# Patient Record
Sex: MALE | URBAN - METROPOLITAN AREA
[De-identification: names, ages, dates, MRNs, and addresses within clinical notes are randomized per-mention and may not be internally consistent; named-entity substitution may affect disease eponyms.]

---

## 2021-03-09 ENCOUNTER — TELEPHONE (OUTPATIENT)
Dept: OTHER | Age: 76
End: 2021-03-09

## 2021-03-18 ENCOUNTER — TELEPHONE (OUTPATIENT)
Dept: OTHER | Age: 76
End: 2021-03-18

## 2021-03-22 ENCOUNTER — TELEPHONE (OUTPATIENT)
Dept: OTHER | Age: 76
End: 2021-03-22

## 2024-06-10 ENCOUNTER — TELEPHONE (OUTPATIENT)
Dept: PULMONOLOGY | Age: 79
End: 2024-06-10

## 2024-07-16 ENCOUNTER — APPOINTMENT (OUTPATIENT)
Dept: URBAN - METROPOLITAN AREA CLINIC 240 | Age: 79
Setting detail: DERMATOLOGY
End: 2024-07-17

## 2024-07-16 DIAGNOSIS — D22 MELANOCYTIC NEVI: ICD-10-CM

## 2024-07-16 DIAGNOSIS — L82.0 INFLAMED SEBORRHEIC KERATOSIS: ICD-10-CM

## 2024-07-16 PROBLEM — D22.5 MELANOCYTIC NEVI OF TRUNK: Status: ACTIVE | Noted: 2024-07-16

## 2024-07-16 PROCEDURE — OTHER LIQUID NITROGEN: OTHER

## 2024-07-16 PROCEDURE — 99212 OFFICE O/P EST SF 10 MIN: CPT | Mod: 25

## 2024-07-16 PROCEDURE — OTHER COUNSELING: OTHER

## 2024-07-16 PROCEDURE — OTHER MIPS QUALITY: OTHER

## 2024-07-16 PROCEDURE — 17110 DESTRUCT B9 LESION 1-14: CPT

## 2024-07-16 RX ORDER — ROSUVASTATIN CALCIUM 5 MG/1
5 TABLET, COATED ORAL DAILY
COMMUNITY

## 2024-07-16 RX ORDER — TIZANIDINE 4 MG/1
TABLET ORAL
COMMUNITY
Start: 2024-04-24

## 2024-07-16 RX ORDER — ONDANSETRON 4 MG/1
TABLET, ORALLY DISINTEGRATING ORAL
COMMUNITY
Start: 2024-06-04

## 2024-07-16 RX ORDER — OMEPRAZOLE 40 MG/1
CAPSULE, DELAYED RELEASE ORAL
COMMUNITY
Start: 2024-05-03

## 2024-07-16 ASSESSMENT — LOCATION ZONE DERM
LOCATION ZONE: TRUNK
LOCATION ZONE: FACE
LOCATION ZONE: LIP
LOCATION ZONE: SCALP

## 2024-07-16 ASSESSMENT — LOCATION DETAILED DESCRIPTION DERM
LOCATION DETAILED: RIGHT SUPERIOR FOREHEAD
LOCATION DETAILED: LEFT LOWER CUTANEOUS LIP
LOCATION DETAILED: RIGHT INFERIOR MEDIAL LOWER BACK
LOCATION DETAILED: RIGHT SUPERIOR PARIETAL SCALP
LOCATION DETAILED: LEFT SUPERIOR PARIETAL SCALP

## 2024-07-16 ASSESSMENT — LOCATION SIMPLE DESCRIPTION DERM
LOCATION SIMPLE: SCALP
LOCATION SIMPLE: RIGHT FOREHEAD
LOCATION SIMPLE: RIGHT LOWER BACK
LOCATION SIMPLE: LEFT LIP

## 2024-07-16 NOTE — PROCEDURE: LIQUID NITROGEN
Show Applicator Variable?: Yes
Include Z78.9 (Other Specified Conditions Influencing Health Status) As An Associated Diagnosis?: No
Post-Care Instructions: I reviewed with the patient in detail post-care instructions. Patient is to wear sunprotection, and avoid picking at any of the treated lesions. Pt may apply Vaseline to crusted or scabbing areas.
Detail Level: Detailed
Consent: The patient's consent was obtained including but not limited to risks of crusting, scabbing, blistering, scarring, darker or lighter pigmentary change, recurrence, incomplete removal and infection.
Spray Paint Text: The liquid nitrogen was applied to the skin utilizing a spray paint frosting technique.
Application Tool (Optional): Liquid Nitrogen Sprayer
Medical Necessity Clause: This procedure was medically necessary because the lesions that were treated were:
Medical Necessity Information: It is in your best interest to select a reason for this procedure from the list below. All of these items fulfill various CMS LCD requirements except the new and changing color options.

## 2024-07-22 ENCOUNTER — APPOINTMENT (OUTPATIENT)
Dept: PULMONOLOGY | Age: 79
End: 2024-07-22

## 2024-07-22 VITALS
HEIGHT: 72 IN | DIASTOLIC BLOOD PRESSURE: 87 MMHG | SYSTOLIC BLOOD PRESSURE: 144 MMHG | RESPIRATION RATE: 17 BRPM | WEIGHT: 185 LBS | OXYGEN SATURATION: 98 % | TEMPERATURE: 68 F | BODY MASS INDEX: 25.06 KG/M2

## 2024-07-22 DIAGNOSIS — R91.8 MULTIPLE PULMONARY NODULES: Primary | ICD-10-CM

## 2024-07-22 PROCEDURE — 99244 OFF/OP CNSLTJ NEW/EST MOD 40: CPT | Performed by: INTERNAL MEDICINE

## 2024-07-22 RX ORDER — VANCOMYCIN HYDROCHLORIDE 250 MG/1
CAPSULE ORAL
COMMUNITY
Start: 2024-07-09

## 2024-07-26 ENCOUNTER — TELEPHONE (OUTPATIENT)
Dept: PULMONOLOGY | Age: 79
End: 2024-07-26

## 2024-07-26 DIAGNOSIS — R91.8 MULTIPLE PULMONARY NODULES: Primary | ICD-10-CM

## 2024-08-12 ENCOUNTER — APPOINTMENT (OUTPATIENT)
Dept: PET IMAGING | Age: 79
End: 2024-08-12
Attending: NURSE PRACTITIONER

## 2024-08-13 ENCOUNTER — APPOINTMENT (OUTPATIENT)
Dept: URBAN - METROPOLITAN AREA CLINIC 240 | Age: 79
Setting detail: DERMATOLOGY
End: 2024-08-13

## 2024-08-13 DIAGNOSIS — D49.2 NEOPLASM OF UNSPECIFIED BEHAVIOR OF BONE, SOFT TISSUE, AND SKIN: ICD-10-CM

## 2024-08-13 PROCEDURE — OTHER BIOPSY BY SHAVE METHOD: OTHER

## 2024-08-13 PROCEDURE — 11102 TANGNTL BX SKIN SINGLE LES: CPT

## 2024-08-13 PROCEDURE — OTHER PHOTO-DOCUMENTATION: OTHER

## 2024-08-13 PROCEDURE — OTHER COUNSELING: OTHER

## 2024-08-13 ASSESSMENT — LOCATION SIMPLE DESCRIPTION DERM: LOCATION SIMPLE: LEFT SCALP

## 2024-08-13 ASSESSMENT — LOCATION ZONE DERM: LOCATION ZONE: SCALP

## 2024-08-13 ASSESSMENT — LOCATION DETAILED DESCRIPTION DERM: LOCATION DETAILED: LEFT LATERAL FRONTAL SCALP

## 2024-08-13 NOTE — PROCEDURE: BIOPSY BY SHAVE METHOD
Detail Level: Detailed
Depth Of Biopsy: dermis
Was A Bandage Applied: Yes
Size Of Lesion In Cm: 0
Biopsy Type: H and E
Biopsy Method: sterile single edge surgical blade
Anesthesia Type: 1% lidocaine with epinephrine and a 1:10 solution of 8.4% sodium bicarbonate
Anesthesia Volume In Cc: 0.5
Hemostasis: Ferric chloride
Wound Care: No ointment
Dressing: bandage
Destruction After The Procedure: No
Type Of Destruction Used: Curettage
Curettage Text: The wound bed was treated with curettage after the biopsy was performed.
Cryotherapy Text: The wound bed was treated with cryotherapy after the biopsy was performed.
Electrodesiccation Text: The wound bed was treated with electrodesiccation after the biopsy was performed.
Electrodesiccation And Curettage Text: The wound bed was treated with electrodesiccation and curettage after the biopsy was performed.
Silver Nitrate Text: The wound bed was treated with silver nitrate after the biopsy was performed.
Lab: [id number]
Consent: Written consent was obtained and risks were reviewed including but not limited to scarring, bleeding, scabbing.
Post-Care Instructions: I reviewed with the patient in detail post-care instructions.
Notification Instructions: Patient will be notified of biopsy results. However, patient instructed to call the office if not contacted within 2 weeks.
Billing Type: Third-Party Bill
Information: Selecting Yes will display possible errors in your note based on the variables you have selected. This validation is only offered as a suggestion for you. PLEASE NOTE THAT THE VALIDATION TEXT WILL BE REMOVED WHEN YOU FINALIZE YOUR NOTE. IF YOU WANT TO FAX A PRELIMINARY NOTE YOU WILL NEED TO TOGGLE THIS TO 'NO' IF YOU DO NOT WANT IT IN YOUR FAXED NOTE.

## 2024-08-22 ENCOUNTER — HOSPITAL ENCOUNTER (OUTPATIENT)
Dept: PET IMAGING | Age: 79
Discharge: HOME | End: 2024-08-22
Attending: NURSE PRACTITIONER

## 2024-08-22 DIAGNOSIS — R91.8 MULTIPLE PULMONARY NODULES: ICD-10-CM

## 2024-08-22 LAB — GLUCOSE BLDC GLUCOMTR-MCNC: 106 MG/DL (ref 70–99)

## 2024-08-22 PROCEDURE — 82962 GLUCOSE BLOOD TEST: CPT

## 2024-08-22 PROCEDURE — 10006150 HB RX 343: Performed by: NURSE PRACTITIONER

## 2024-08-22 PROCEDURE — 78815 PET IMAGE W/CT SKULL-THIGH: CPT

## 2024-08-22 PROCEDURE — A9552 F18 FDG: HCPCS | Performed by: NURSE PRACTITIONER

## 2024-08-22 RX ADMIN — FLUDEOXYGLUCOSE F-18 11.95 MILLICURIE: 300 INJECTION INTRAVENOUS at 13:45

## 2024-08-23 DIAGNOSIS — R91.8 MULTIPLE PULMONARY NODULES: Primary | ICD-10-CM

## 2024-11-25 ENCOUNTER — HOSPITAL ENCOUNTER (OUTPATIENT)
Dept: CT IMAGING | Age: 79
Discharge: HOME | End: 2024-11-25
Attending: NURSE PRACTITIONER

## 2024-11-25 DIAGNOSIS — R91.8 MULTIPLE PULMONARY NODULES: ICD-10-CM

## 2024-11-25 LAB
CREAT SERPL-MCNC: 1.1 MG/DL (ref 0.67–1.17)
EGFRCR SERPLBLD CKD-EPI 2021: 68 ML/MIN/{1.73_M2}

## 2024-11-25 PROCEDURE — 10002805 HB CONTRAST AGENT: Performed by: NURSE PRACTITIONER

## 2024-11-25 PROCEDURE — 71260 CT THORAX DX C+: CPT

## 2024-11-25 PROCEDURE — 82565 ASSAY OF CREATININE: CPT

## 2024-11-25 RX ADMIN — IOHEXOL 80 ML: 350 INJECTION, SOLUTION INTRAVENOUS at 13:26

## 2024-11-27 ENCOUNTER — TELEPHONE (OUTPATIENT)
Dept: PULMONOLOGY | Age: 79
End: 2024-11-27

## 2025-01-28 ENCOUNTER — APPOINTMENT (OUTPATIENT)
Dept: PULMONOLOGY | Age: 80
End: 2025-01-28

## 2025-02-03 ENCOUNTER — TELEPHONE (OUTPATIENT)
Dept: PULMONOLOGY | Age: 80
End: 2025-02-03

## 2025-02-04 ENCOUNTER — APPOINTMENT (OUTPATIENT)
Dept: PULMONOLOGY | Age: 80
End: 2025-02-04